# Patient Record
Sex: FEMALE | Race: OTHER | NOT HISPANIC OR LATINO
[De-identification: names, ages, dates, MRNs, and addresses within clinical notes are randomized per-mention and may not be internally consistent; named-entity substitution may affect disease eponyms.]

---

## 2017-04-03 ENCOUNTER — FORM ENCOUNTER (OUTPATIENT)
Age: 42
End: 2017-04-03

## 2017-04-04 ENCOUNTER — APPOINTMENT (OUTPATIENT)
Dept: ULTRASOUND IMAGING | Facility: IMAGING CENTER | Age: 42
End: 2017-04-04

## 2017-04-04 ENCOUNTER — OUTPATIENT (OUTPATIENT)
Dept: OUTPATIENT SERVICES | Facility: HOSPITAL | Age: 42
LOS: 1 days | End: 2017-04-04
Payer: COMMERCIAL

## 2017-04-04 DIAGNOSIS — Z00.8 ENCOUNTER FOR OTHER GENERAL EXAMINATION: ICD-10-CM

## 2017-04-04 PROCEDURE — 76642 ULTRASOUND BREAST LIMITED: CPT

## 2017-10-17 ENCOUNTER — MEDICATION RENEWAL (OUTPATIENT)
Age: 42
End: 2017-10-17

## 2017-12-04 ENCOUNTER — APPOINTMENT (OUTPATIENT)
Dept: INTERNAL MEDICINE | Facility: CLINIC | Age: 42
End: 2017-12-04
Payer: COMMERCIAL

## 2017-12-04 ENCOUNTER — NON-APPOINTMENT (OUTPATIENT)
Age: 42
End: 2017-12-04

## 2017-12-04 VITALS
DIASTOLIC BLOOD PRESSURE: 80 MMHG | SYSTOLIC BLOOD PRESSURE: 110 MMHG | TEMPERATURE: 97.9 F | HEART RATE: 84 BPM | WEIGHT: 134 LBS | OXYGEN SATURATION: 98 % | HEIGHT: 62 IN | BODY MASS INDEX: 24.66 KG/M2

## 2017-12-04 DIAGNOSIS — R00.0 TACHYCARDIA, UNSPECIFIED: ICD-10-CM

## 2017-12-04 DIAGNOSIS — R76.8 OTHER SPECIFIED ABNORMAL IMMUNOLOGICAL FINDINGS IN SERUM: ICD-10-CM

## 2017-12-04 PROCEDURE — 36415 COLL VENOUS BLD VENIPUNCTURE: CPT

## 2017-12-04 PROCEDURE — 99396 PREV VISIT EST AGE 40-64: CPT | Mod: 25

## 2017-12-04 PROCEDURE — 93000 ELECTROCARDIOGRAM COMPLETE: CPT

## 2017-12-05 LAB
25(OH)D3 SERPL-MCNC: 36.1 NG/ML
ALBUMIN SERPL ELPH-MCNC: 5.1 G/DL
ALP BLD-CCNC: 68 U/L
ALT SERPL-CCNC: 20 U/L
ANION GAP SERPL CALC-SCNC: 16 MMOL/L
APPEARANCE: CLEAR
AST SERPL-CCNC: 21 U/L
BASOPHILS # BLD AUTO: 0.04 K/UL
BASOPHILS NFR BLD AUTO: 0.6 %
BILIRUB SERPL-MCNC: 0.4 MG/DL
BILIRUBIN URINE: NEGATIVE
BLOOD URINE: NEGATIVE
BUN SERPL-MCNC: 12 MG/DL
CALCIUM SERPL-MCNC: 10.5 MG/DL
CHLORIDE SERPL-SCNC: 103 MMOL/L
CHOLEST SERPL-MCNC: 189 MG/DL
CHOLEST/HDLC SERPL: 3.4 RATIO
CO2 SERPL-SCNC: 26 MMOL/L
COLOR: YELLOW
CREAT SERPL-MCNC: 0.6 MG/DL
EOSINOPHIL # BLD AUTO: 0.17 K/UL
EOSINOPHIL NFR BLD AUTO: 2.5
ESTRADIOL SERPL-MCNC: <5 PG/ML
FSH SERPL-MCNC: 37.1 IU/L
GLUCOSE QUALITATIVE U: NEGATIVE MG/DL
GLUCOSE SERPL-MCNC: 90 MG/DL
HBA1C MFR BLD HPLC: 5.5 %
HCT VFR BLD CALC: 45.6 %
HDLC SERPL-MCNC: 55 MG/DL
HGB BLD-MCNC: 14.8 G/DL
IMM GRANULOCYTES NFR BLD AUTO: 0.1 %
KETONES URINE: NEGATIVE
LDLC SERPL CALC-MCNC: 111 MG/DL
LEUKOCYTE ESTERASE URINE: NEGATIVE
LH SERPL-ACNC: 22.3 IU/L
LYMPHOCYTES # BLD AUTO: 2.43 K/UL
LYMPHOCYTES NFR BLD AUTO: 36.4 %
MAN DIFF?: NORMAL
MCHC RBC-ENTMCNC: 27.1 PG
MCHC RBC-ENTMCNC: 32.5 GM/DL
MCV RBC AUTO: 83.4 FL
MONOCYTES # BLD AUTO: 0.31 K/UL
MONOCYTES NFR BLD AUTO: 4.6 %
NEUTROPHILS # BLD AUTO: 3.72 K/UL
NEUTROPHILS NFR BLD AUTO: 55.8 %
NITRITE URINE: NEGATIVE
PH URINE: 6
PLATELET # BLD AUTO: 216 K/UL
POTASSIUM SERPL-SCNC: 4.7 MMOL/L
PROT SERPL-MCNC: 8.5 G/DL
PROTEIN URINE: NEGATIVE MG/DL
RBC # BLD: 5.47 M/UL
RBC # FLD: 14.2 %
SODIUM SERPL-SCNC: 145 MMOL/L
SPECIFIC GRAVITY URINE: 1.01
T4 FREE SERPL-MCNC: 1.3 NG/DL
TRIGL SERPL-MCNC: 115 MG/DL
TSH SERPL-ACNC: 3.14 UIU/ML
UROBILINOGEN URINE: NEGATIVE MG/DL
VIT B12 SERPL-MCNC: 737 PG/ML
WBC # FLD AUTO: 6.68 K/UL

## 2017-12-07 LAB
ANA PAT FLD IF-IMP: ABNORMAL
ANA SER IF-ACNC: ABNORMAL
THYROGLOB AB SERPL-ACNC: <20 IU/ML
THYROPEROXIDASE AB SERPL IA-ACNC: <10 IU/ML

## 2018-03-05 ENCOUNTER — APPOINTMENT (OUTPATIENT)
Dept: INTERNAL MEDICINE | Facility: CLINIC | Age: 43
End: 2018-03-05
Payer: COMMERCIAL

## 2018-03-05 VITALS
HEART RATE: 88 BPM | HEIGHT: 62 IN | BODY MASS INDEX: 24.29 KG/M2 | TEMPERATURE: 98.5 F | SYSTOLIC BLOOD PRESSURE: 126 MMHG | DIASTOLIC BLOOD PRESSURE: 78 MMHG | WEIGHT: 132 LBS

## 2018-03-05 DIAGNOSIS — R94.6 ABNORMAL RESULTS OF THYROID FUNCTION STUDIES: ICD-10-CM

## 2018-03-05 PROCEDURE — 99214 OFFICE O/P EST MOD 30 MIN: CPT | Mod: 25

## 2018-03-05 PROCEDURE — 36415 COLL VENOUS BLD VENIPUNCTURE: CPT

## 2018-03-06 ENCOUNTER — MEDICATION RENEWAL (OUTPATIENT)
Age: 43
End: 2018-03-06

## 2018-03-06 LAB
CHOLEST SERPL-MCNC: 235 MG/DL
CHOLEST/HDLC SERPL: 4.6 RATIO
HDLC SERPL-MCNC: 51 MG/DL
LDLC SERPL CALC-MCNC: 153 MG/DL
TRIGL SERPL-MCNC: 155 MG/DL
TSH SERPL-ACNC: 2.93 UIU/ML

## 2018-05-07 ENCOUNTER — APPOINTMENT (OUTPATIENT)
Dept: INTERNAL MEDICINE | Facility: CLINIC | Age: 43
End: 2018-05-07
Payer: COMMERCIAL

## 2018-05-07 VITALS
HEIGHT: 62 IN | TEMPERATURE: 99.1 F | OXYGEN SATURATION: 98 % | SYSTOLIC BLOOD PRESSURE: 100 MMHG | BODY MASS INDEX: 23.92 KG/M2 | DIASTOLIC BLOOD PRESSURE: 60 MMHG | HEART RATE: 105 BPM | WEIGHT: 130 LBS

## 2018-05-07 PROCEDURE — 99213 OFFICE O/P EST LOW 20 MIN: CPT

## 2018-05-08 ENCOUNTER — RESULT REVIEW (OUTPATIENT)
Age: 43
End: 2018-05-08

## 2018-05-08 LAB
HMPV RNA SPEC QL NAA+PROBE: DETECTED
RAPID RVP RESULT: DETECTED

## 2018-05-14 ENCOUNTER — APPOINTMENT (OUTPATIENT)
Dept: INTERNAL MEDICINE | Facility: CLINIC | Age: 43
End: 2018-05-14

## 2018-11-14 ENCOUNTER — MEDICATION RENEWAL (OUTPATIENT)
Age: 43
End: 2018-11-14

## 2019-01-25 ENCOUNTER — APPOINTMENT (OUTPATIENT)
Dept: INTERNAL MEDICINE | Facility: CLINIC | Age: 44
End: 2019-01-25
Payer: COMMERCIAL

## 2019-01-25 VITALS
HEIGHT: 62 IN | WEIGHT: 135 LBS | SYSTOLIC BLOOD PRESSURE: 118 MMHG | HEART RATE: 74 BPM | TEMPERATURE: 97.9 F | OXYGEN SATURATION: 99 % | DIASTOLIC BLOOD PRESSURE: 78 MMHG | BODY MASS INDEX: 24.84 KG/M2

## 2019-01-25 DIAGNOSIS — H52.7 UNSPECIFIED DISORDER OF REFRACTION: ICD-10-CM

## 2019-01-25 PROCEDURE — 99396 PREV VISIT EST AGE 40-64: CPT

## 2019-01-25 RX ORDER — AZITHROMYCIN 250 MG/1
250 TABLET, FILM COATED ORAL
Qty: 1 | Refills: 0 | Status: DISCONTINUED | COMMUNITY
Start: 2018-05-07 | End: 2019-01-25

## 2019-01-25 NOTE — HEALTH RISK ASSESSMENT
[Good] : ~his/her~  mood as  good [No falls in past year] : Patient reported no falls in the past year [0] : 2) Feeling down, depressed, or hopeless: Not at all (0) [Patient reported PAP Smear was normal] : Patient reported PAP Smear was normal [With Significant Other] : lives with significant other [Unemployed] : unemployed [] :  [Feels Safe at Home] : Feels safe at home [Fully functional (bathing, dressing, toileting, transferring, walking, feeding)] : Fully functional (bathing, dressing, toileting, transferring, walking, feeding) [Fully functional (using the telephone, shopping, preparing meals, housekeeping, doing laundry, using] : Fully functional and needs no help or supervision to perform IADLs (using the telephone, shopping, preparing meals, housekeeping, doing laundry, using transportation, managing medications and managing finances) [] : No [de-identified] : none  [[id number]] : 0 [Reports changes in hearing] : Reports no changes in hearing [Reports changes in vision] : Reports no changes in vision [Reports changes in dental health] : Reports no changes in dental health [PapSmearDate] : 12/2016

## 2019-01-25 NOTE — ASSESSMENT
[FreeTextEntry1] : refractory error - to see ophtho\par \par irregular periods \par - LMP 9/2018 ? menopause \par - get HCG \par - see gyn \par  \par Headache tension - resolved now \par - advised hydration \par - adviced to take melatonin and PRN Naproxen as needed \par \par Anxiety / stress / situational - better \par - 50% of the time was spent counseling patient about ways to how to handle her stress, Educated patient to take frequent breaks, mini vacations, breathing exercises relaxation.\par \par Hyperlipidemia\par -on as needed  statins , check lipid levels and liver functions.\par -eat a Low-fat diet, avoid red meat, cheese, butter, peanuts and exercise daily for 40 minutes.\par \par arthritis - repeat RAINA \par \par HCM-\par Mammogram- 6/2016  dense breast , requisition given, educated her risk of breast cancer, has fibroadenoma \par Pap smear- 12/2016 \par Tdap- given 5/2014 \par flu vaccine refused will get at pharmacy \par

## 2019-01-25 NOTE — HISTORY OF PRESENT ILLNESS
[FreeTextEntry1] : \par has 3 kids , 24 daughter(pharmacist)  21 son( premed at [employer])  16 yrs daughter \par  house wife \par \par hx of  headache left side chronic - better now \par - has not had once in few months  -resolved with changes in sleep and eating habits \par -mother in law had dementia -dies 2019 \par \par h/o Abnormal TSH-  gained weight , feels tired and exhausted , very stressed at times \par \par Irregular periods --saw GYN   ,still irregular - LMP- 2018 - q 2-3 months \par \par h/o H pylori infection - 3 yrs ago s/p EGD and dx rx with antibiotics for 14 days , asymptomatic at present \par \par Hyperlipidemia- \par -on Lipitor on and off \par - was started on lipitor by old md for high chol and strong family hx of cad - mom CABG in 57, another MI now at age 68 ,  DAD cad MI at 45 h/o 3 mi   72\par c/o pain in muscles and joints , numbness in both hands \par doing exercise cardio once a week and weights and lisa stretching etc  \par \par came back from [place] 19 - feels better

## 2019-01-28 LAB
25(OH)D3 SERPL-MCNC: 33.8 NG/ML
ALBUMIN SERPL ELPH-MCNC: 4.4 G/DL
ALP BLD-CCNC: 65 U/L
ALT SERPL-CCNC: 23 U/L
ANION GAP SERPL CALC-SCNC: 12 MMOL/L
APPEARANCE: CLEAR
AST SERPL-CCNC: 26 U/L
BASOPHILS # BLD AUTO: 0.02 K/UL
BASOPHILS NFR BLD AUTO: 0.3 %
BILIRUB SERPL-MCNC: 0.3 MG/DL
BILIRUBIN URINE: NEGATIVE
BLOOD URINE: NEGATIVE
BUN SERPL-MCNC: 19 MG/DL
CALCIUM SERPL-MCNC: 9.9 MG/DL
CHLORIDE SERPL-SCNC: 106 MMOL/L
CHOLEST SERPL-MCNC: 182 MG/DL
CHOLEST/HDLC SERPL: 3.7 RATIO
CO2 SERPL-SCNC: 25 MMOL/L
COLOR: YELLOW
CREAT SERPL-MCNC: 0.72 MG/DL
EOSINOPHIL # BLD AUTO: 0.13 K/UL
EOSINOPHIL NFR BLD AUTO: 2.2 %
GLUCOSE QUALITATIVE U: NEGATIVE MG/DL
GLUCOSE SERPL-MCNC: 95 MG/DL
HBA1C MFR BLD HPLC: 5.6 %
HCG SERPL-MCNC: <1 MIU/ML
HCT VFR BLD CALC: 42.2 %
HDLC SERPL-MCNC: 49 MG/DL
HGB BLD-MCNC: 13.7 G/DL
IMM GRANULOCYTES NFR BLD AUTO: 0.2 %
KETONES URINE: NEGATIVE
LDLC SERPL CALC-MCNC: 117 MG/DL
LEUKOCYTE ESTERASE URINE: NEGATIVE
LYMPHOCYTES # BLD AUTO: 2.22 K/UL
LYMPHOCYTES NFR BLD AUTO: 37.8 %
MAN DIFF?: NORMAL
MCHC RBC-ENTMCNC: 26.7 PG
MCHC RBC-ENTMCNC: 32.5 GM/DL
MCV RBC AUTO: 82.1 FL
MONOCYTES # BLD AUTO: 0.29 K/UL
MONOCYTES NFR BLD AUTO: 4.9 %
NEUTROPHILS # BLD AUTO: 3.2 K/UL
NEUTROPHILS NFR BLD AUTO: 54.6 %
NITRITE URINE: NEGATIVE
PH URINE: 5
PLATELET # BLD AUTO: 191 K/UL
POTASSIUM SERPL-SCNC: 4.5 MMOL/L
PROT SERPL-MCNC: 7.3 G/DL
PROTEIN URINE: NEGATIVE MG/DL
RBC # BLD: 5.14 M/UL
RBC # FLD: 14.1 %
SODIUM SERPL-SCNC: 143 MMOL/L
SPECIFIC GRAVITY URINE: 1.02
TRIGL SERPL-MCNC: 81 MG/DL
TSH SERPL-ACNC: 2.19 UIU/ML
UROBILINOGEN URINE: NEGATIVE MG/DL
VIT B12 SERPL-MCNC: 615 PG/ML
WBC # FLD AUTO: 5.87 K/UL

## 2019-09-24 ENCOUNTER — MEDICATION RENEWAL (OUTPATIENT)
Age: 44
End: 2019-09-24

## 2019-12-09 ENCOUNTER — NON-APPOINTMENT (OUTPATIENT)
Age: 44
End: 2019-12-09

## 2019-12-09 ENCOUNTER — APPOINTMENT (OUTPATIENT)
Dept: INTERNAL MEDICINE | Facility: CLINIC | Age: 44
End: 2019-12-09
Payer: COMMERCIAL

## 2019-12-09 VITALS
WEIGHT: 140 LBS | HEART RATE: 96 BPM | DIASTOLIC BLOOD PRESSURE: 70 MMHG | HEIGHT: 62 IN | TEMPERATURE: 98 F | OXYGEN SATURATION: 98 % | SYSTOLIC BLOOD PRESSURE: 125 MMHG | BODY MASS INDEX: 25.76 KG/M2

## 2019-12-09 DIAGNOSIS — R20.2 ANESTHESIA OF SKIN: ICD-10-CM

## 2019-12-09 DIAGNOSIS — R20.0 ANESTHESIA OF SKIN: ICD-10-CM

## 2019-12-09 DIAGNOSIS — G44.209 TENSION-TYPE HEADACHE, UNSPECIFIED, NOT INTRACTABLE: ICD-10-CM

## 2019-12-09 DIAGNOSIS — M25.562 PAIN IN LEFT KNEE: ICD-10-CM

## 2019-12-09 DIAGNOSIS — Z87.09 PERSONAL HISTORY OF OTHER DISEASES OF THE RESPIRATORY SYSTEM: ICD-10-CM

## 2019-12-09 PROCEDURE — 99214 OFFICE O/P EST MOD 30 MIN: CPT | Mod: 25

## 2019-12-09 PROCEDURE — G0008: CPT

## 2019-12-09 PROCEDURE — 93000 ELECTROCARDIOGRAM COMPLETE: CPT

## 2019-12-09 PROCEDURE — 90686 IIV4 VACC NO PRSV 0.5 ML IM: CPT

## 2019-12-09 NOTE — ASSESSMENT
[FreeTextEntry1] : C/o b/l upper ext numbness and tingling - on an doff through day \par - at times wakes up with numbness at night \par - EKG- 89 bpm NSR \par - referral to see cardio given \par \par lower back pain / radiculopathy \par - advised to avoid pulling pushing , lifting , carrying weights , bending , kneeling , squatting \par - do warm compresses \par -Xray l-spine \par -physical therapy if no help -referral to see ortho given \par -naproxen 500 po BID with food \par - side effects reviewed \par - rtc if no improvement\par \par Headache tension - resolved now \par - advised hydration \par - advice to take melatonin and PRN Naproxen as needed \par \par Anxiety / stress / situational - better \par - 50% of the time was spent counseling patient about ways to how to handle her stress, Educated patient to take frequent breaks, mini vacations, breathing exercises relaxation.\par \par Hyperlipidemia\par -on as needed statins , check lipid levels and liver functions.\par -eat a Low-fat diet, avoid red meat, cheese, butter, peanuts and exercise daily for 40 minutes.\par \par \par HCM-\par Mammogram- 6/2016 dense breast , requisition given, educated her risk of breast cancer, has fibroadenoma \par Pap smear- 12/2016 advised \par Tdap- given 5/2014 \par flu vaccine- given today

## 2019-12-09 NOTE — HISTORY OF PRESENT ILLNESS
[de-identified] : has 3 kids , 24 daughter(pharmacist) 21 son( premed at [employer]) 16 yrs daughter \par  house wife \par \par left knee pain and swelling x 1 month \par - symptoms are better now - but gets pain with climbing , cannot bend knee all the way not able to exercise \par -left knee feels stiff \par \par c/o numbness in B/l upper ext on and off \par -no CP + tingling , no MORENO \par \par hx of headache left side chronic - better now \par - has not had once in few months -resolved with changes in sleep and eating habits \par -mother in law had dementia -dies 2019 \par \par h/o Abnormal TSH- gained weight , feels tired and exhausted , very stressed at times \par \par Irregular periods --saw GYN 2016 ,still irregular - LMP-2019 \par \par Hyperlipidemia- \par -on Lipitor on and off \par - was started on lipitor by old md for high chol and strong family hx of cad - mom CABG in 57, another MI now at age 68 , DAD cad MI at 45 h/o 3 mi  70\par c/o pain in muscles and joints , numbness in both hands \par

## 2020-03-09 ENCOUNTER — APPOINTMENT (OUTPATIENT)
Dept: INTERNAL MEDICINE | Facility: CLINIC | Age: 45
End: 2020-03-09
Payer: COMMERCIAL

## 2020-03-09 ENCOUNTER — LABORATORY RESULT (OUTPATIENT)
Age: 45
End: 2020-03-09

## 2020-03-09 VITALS
OXYGEN SATURATION: 98 % | DIASTOLIC BLOOD PRESSURE: 66 MMHG | WEIGHT: 136 LBS | BODY MASS INDEX: 25.03 KG/M2 | SYSTOLIC BLOOD PRESSURE: 120 MMHG | HEIGHT: 62 IN | HEART RATE: 83 BPM | TEMPERATURE: 98.5 F

## 2020-03-09 PROCEDURE — 36415 COLL VENOUS BLD VENIPUNCTURE: CPT

## 2020-03-09 PROCEDURE — 99396 PREV VISIT EST AGE 40-64: CPT | Mod: 25

## 2020-03-09 PROCEDURE — G0442 ANNUAL ALCOHOL SCREEN 15 MIN: CPT

## 2020-03-09 RX ORDER — NAPROXEN 500 MG/1
500 TABLET ORAL
Qty: 14 | Refills: 0 | Status: DISCONTINUED | COMMUNITY
Start: 2019-12-09 | End: 2020-03-09

## 2020-03-09 NOTE — HISTORY OF PRESENT ILLNESS
[de-identified] : came in for annual check up \par \par has 3 kids , 25 daughter(pharmacist) 22 son( premed at [employer]) 17 yrs daughter \par  house wife \par \par left knee pain and swelling x 1 month \par - symptoms are better now - but gets pain with climbing , cannot bend knee all the way not able to exercise \par -left knee feels stiff \par \par c/o numbness in B/l upper ext on and off \par -no CP + tingling , no MORENO \par \par hx of headache left side chronic - better now \par - has not had once in few months -resolved with changes in sleep and eating habits \par -mother in law had dementia -dies 2019 \par \par h/o Abnormal TSH- gained weight , feels tired and exhausted , very stressed at times \par \par Irregular periods --saw GYN  ,still irregular - 3-6 months -occ spotting \par \par Hyperlipidemia- \par -on Lipitor on and off \par - was started on lipitor by old md for high chol and strong family hx of cad - mom CABG in 57, another MI now at age 68 , DAD cad MI at 45 h/o 3 mi  72\par c/o pain in muscles and joints , numbness in both hands \par

## 2020-03-09 NOTE — ASSESSMENT
[FreeTextEntry1] : C/o b/l upper ext numbness and tingling - on an doff through day \par -better now \par \par lower back pain / radiculopathy \par - advised to avoid pulling pushing , lifting , carrying weights , bending , kneeling , squatting \par - do warm compresses \par \par Headache tension - resolved now \par - advised hydration \par - advice to take melatonin and PRN Naproxen as needed \par \par Anxiety / stress / situational - better \par - 50% of the time was spent counseling patient about ways to how to handle her stress, Educated patient to take frequent breaks, mini vacations, breathing exercises relaxation.\par \par Hyperlipidemia\par -on as needed statins , check lipid levels and liver functions.\par -eat a Low-fat diet, avoid red meat, cheese, butter, peanuts and exercise daily for 40 minutes.\par \par HCM-\par Mammogram- 6/2016 dense breast , requisition given, educated her risk of breast cancer, has fibroadenoma \par Pap smear- 12/2016 advised \par Tdap- given 5/2014 \par flu vaccine-12/2019 \par

## 2020-03-09 NOTE — HEALTH RISK ASSESSMENT
[Good] : ~his/her~  mood as  good [No] : No [No falls in past year] : Patient reported no falls in the past year [0] : 2) Feeling down, depressed, or hopeless: Not at all (0) [Patient reported PAP Smear was normal] : Patient reported PAP Smear was normal [With Significant Other] : lives with significant other [Unemployed] : unemployed [] :  [Feels Safe at Home] : Feels safe at home [Fully functional (bathing, dressing, toileting, transferring, walking, feeding)] : Fully functional (bathing, dressing, toileting, transferring, walking, feeding) [Fully functional (using the telephone, shopping, preparing meals, housekeeping, doing laundry, using] : Fully functional and needs no help or supervision to perform IADLs (using the telephone, shopping, preparing meals, housekeeping, doing laundry, using transportation, managing medications and managing finances) [] : No [de-identified] : gym  [[id number]] : 0 [Reports changes in hearing] : Reports no changes in hearing [Reports changes in vision] : Reports no changes in vision [Reports changes in dental health] : Reports no changes in dental health [PapSmearDate] : 2016

## 2020-03-10 LAB
25(OH)D3 SERPL-MCNC: 31.1 NG/ML
ALBUMIN SERPL ELPH-MCNC: 4.6 G/DL
ALP BLD-CCNC: 72 U/L
ALT SERPL-CCNC: 24 U/L
ANION GAP SERPL CALC-SCNC: 16 MMOL/L
APPEARANCE: CLEAR
AST SERPL-CCNC: 20 U/L
BASOPHILS # BLD AUTO: 0.08 K/UL
BASOPHILS NFR BLD AUTO: 1.3 %
BILIRUB SERPL-MCNC: 0.3 MG/DL
BILIRUBIN URINE: NEGATIVE
BLOOD URINE: ABNORMAL
BUN SERPL-MCNC: 13 MG/DL
CALCIUM SERPL-MCNC: 9.6 MG/DL
CCP AB SER IA-ACNC: <8 UNITS
CHLORIDE SERPL-SCNC: 104 MMOL/L
CHOLEST SERPL-MCNC: 204 MG/DL
CHOLEST/HDLC SERPL: 4.6 RATIO
CO2 SERPL-SCNC: 20 MMOL/L
COLOR: YELLOW
CREAT SERPL-MCNC: 0.64 MG/DL
EOSINOPHIL # BLD AUTO: 0.13 K/UL
EOSINOPHIL NFR BLD AUTO: 2.1 %
ESTIMATED AVERAGE GLUCOSE: 108 MG/DL
GLUCOSE QUALITATIVE U: NEGATIVE
GLUCOSE SERPL-MCNC: 91 MG/DL
HBA1C MFR BLD HPLC: 5.4 %
HCT VFR BLD CALC: 42 %
HDLC SERPL-MCNC: 44 MG/DL
HGB BLD-MCNC: 13.7 G/DL
IMM GRANULOCYTES NFR BLD AUTO: 0.2 %
KETONES URINE: NEGATIVE
LDLC SERPL CALC-MCNC: 140 MG/DL
LEUKOCYTE ESTERASE URINE: NEGATIVE
LYMPHOCYTES # BLD AUTO: 2.16 K/UL
LYMPHOCYTES NFR BLD AUTO: 34.7 %
MAN DIFF?: NORMAL
MCHC RBC-ENTMCNC: 27.3 PG
MCHC RBC-ENTMCNC: 32.6 GM/DL
MCV RBC AUTO: 83.7 FL
MONOCYTES # BLD AUTO: 0.31 K/UL
MONOCYTES NFR BLD AUTO: 5 %
NEUTROPHILS # BLD AUTO: 3.53 K/UL
NEUTROPHILS NFR BLD AUTO: 56.7 %
NITRITE URINE: NEGATIVE
PH URINE: 5.5
PLATELET # BLD AUTO: 238 K/UL
POTASSIUM SERPL-SCNC: 4.5 MMOL/L
PROT SERPL-MCNC: 7.1 G/DL
PROTEIN URINE: NEGATIVE
RBC # BLD: 5.02 M/UL
RBC # FLD: 14 %
RF+CCP IGG SER-IMP: NEGATIVE
RHEUMATOID FACT SER QL: <10 IU/ML
SODIUM SERPL-SCNC: 140 MMOL/L
SPECIFIC GRAVITY URINE: 1.02
TRIGL SERPL-MCNC: 99 MG/DL
TSH SERPL-ACNC: 1.7 UIU/ML
UROBILINOGEN URINE: NORMAL
VIT B12 SERPL-MCNC: 744 PG/ML
WBC # FLD AUTO: 6.22 K/UL

## 2020-03-11 LAB — ANA SER IF-ACNC: NEGATIVE

## 2020-07-23 ENCOUNTER — OUTPATIENT (OUTPATIENT)
Dept: OUTPATIENT SERVICES | Facility: HOSPITAL | Age: 45
LOS: 1 days | End: 2020-07-23
Payer: COMMERCIAL

## 2020-07-23 ENCOUNTER — APPOINTMENT (OUTPATIENT)
Dept: RADIOLOGY | Facility: IMAGING CENTER | Age: 45
End: 2020-07-23
Payer: COMMERCIAL

## 2020-07-23 ENCOUNTER — APPOINTMENT (OUTPATIENT)
Dept: ULTRASOUND IMAGING | Facility: IMAGING CENTER | Age: 45
End: 2020-07-23
Payer: COMMERCIAL

## 2020-07-23 ENCOUNTER — RESULT REVIEW (OUTPATIENT)
Age: 45
End: 2020-07-23

## 2020-07-23 ENCOUNTER — APPOINTMENT (OUTPATIENT)
Dept: MAMMOGRAPHY | Facility: IMAGING CENTER | Age: 45
End: 2020-07-23
Payer: COMMERCIAL

## 2020-07-23 DIAGNOSIS — N63.0 UNSPECIFIED LUMP IN UNSPECIFIED BREAST: ICD-10-CM

## 2020-07-23 DIAGNOSIS — Z00.00 ENCOUNTER FOR GENERAL ADULT MEDICAL EXAMINATION WITHOUT ABNORMAL FINDINGS: ICD-10-CM

## 2020-07-23 DIAGNOSIS — Z00.8 ENCOUNTER FOR OTHER GENERAL EXAMINATION: ICD-10-CM

## 2020-07-23 DIAGNOSIS — M25.562 PAIN IN LEFT KNEE: ICD-10-CM

## 2020-07-23 PROCEDURE — 77063 BREAST TOMOSYNTHESIS BI: CPT

## 2020-07-23 PROCEDURE — 76641 ULTRASOUND BREAST COMPLETE: CPT | Mod: 26,50

## 2020-07-23 PROCEDURE — 73562 X-RAY EXAM OF KNEE 3: CPT | Mod: 26,LT

## 2020-07-23 PROCEDURE — 77063 BREAST TOMOSYNTHESIS BI: CPT | Mod: 26

## 2020-07-23 PROCEDURE — 77067 SCR MAMMO BI INCL CAD: CPT | Mod: 26

## 2020-07-23 PROCEDURE — 77067 SCR MAMMO BI INCL CAD: CPT

## 2020-07-23 PROCEDURE — 76641 ULTRASOUND BREAST COMPLETE: CPT

## 2020-07-23 PROCEDURE — 73562 X-RAY EXAM OF KNEE 3: CPT

## 2021-01-14 ENCOUNTER — RX RENEWAL (OUTPATIENT)
Age: 46
End: 2021-01-14

## 2021-03-11 ENCOUNTER — APPOINTMENT (OUTPATIENT)
Dept: INTERNAL MEDICINE | Facility: CLINIC | Age: 46
End: 2021-03-11
Payer: COMMERCIAL

## 2021-03-11 VITALS
SYSTOLIC BLOOD PRESSURE: 118 MMHG | OXYGEN SATURATION: 98 % | HEART RATE: 83 BPM | TEMPERATURE: 97.9 F | BODY MASS INDEX: 25.97 KG/M2 | DIASTOLIC BLOOD PRESSURE: 86 MMHG | WEIGHT: 142 LBS

## 2021-03-11 DIAGNOSIS — M54.5 LOW BACK PAIN: ICD-10-CM

## 2021-03-11 PROCEDURE — 36415 COLL VENOUS BLD VENIPUNCTURE: CPT

## 2021-03-11 PROCEDURE — G0442 ANNUAL ALCOHOL SCREEN 15 MIN: CPT

## 2021-03-11 PROCEDURE — 99396 PREV VISIT EST AGE 40-64: CPT | Mod: 25

## 2021-03-11 PROCEDURE — G0444 DEPRESSION SCREEN ANNUAL: CPT

## 2021-03-11 PROCEDURE — 99072 ADDL SUPL MATRL&STAF TM PHE: CPT

## 2021-03-11 PROCEDURE — 99213 OFFICE O/P EST LOW 20 MIN: CPT | Mod: 25

## 2021-03-11 NOTE — HEALTH RISK ASSESSMENT
[Good] : ~his/her~  mood as  good [No] : No [0] : 2) Feeling down, depressed, or hopeless: Not at all (0) [With Significant Other] : lives with significant other [Unemployed] : unemployed [] :  [] : No [Audit-CScore] : 0 [de-identified] : sedenatry  [[id number]] : 0 [Reports changes in hearing] : Reports no changes in hearing [Reports changes in vision] : Reports no changes in vision [Reports changes in dental health] : Reports no changes in dental health

## 2021-03-11 NOTE — HISTORY OF PRESENT ILLNESS
[de-identified] : came in for annual check up \par \par lost mom 2020 heart attack at age 70 , later uncle also  2021 CAD \par daughter got  10/2020 \par \par has 3 kids , 25 daughter(pharmacist) 22 son( premed at [employer]) 17 yrs daughter \par  house wife \par \par heel pain sp after standing for long on and off admits to wearing flats \par \par Lower back pain ch - no radiculopathy rad to buttock x 4 yrs on and off \par -no CP + tingling , no MORENO \par \par hx of headache left side chronic - better now \par - has not had once in few months -resolved with changes in sleep and eating habits \par -mother in law had dementia -dies 2019 \par \par h/o Abnormal TSH- gained weight , feels tired and exhausted , very stressed at times \par \par Irregular periods --saw GYN  ,still irregular - 3-6 months -occ spotting \par \par Hyperlipidemia- \par -on Lipitor on and off \par - was started on lipitor by old md for high chol and strong family hx of cad - mom CABG in 57, another MI now at age 68, , at 70 yrs from MI  , DAD cad MI at 45 h/o 3 mi  70, maternal uncle  MI 70's \par c/o pain in muscles and joints , numbness in both hands \par \par occ boils in breast fold / axilla - uses mupirocin cream needs rx \par

## 2021-03-11 NOTE — ASSESSMENT
[FreeTextEntry1] : lower back pain / radiculopathy x 4 yrs \par - get xray L spine\par -pt referral given \par - advised to avoid pulling pushing , lifting , carrying weights , bending , kneeling , squatting \par - do warm compresses \par \par Boils rx for Mupirocin cream renewed \par \par Anxiety / stress / situational - better \par - 50% of the time was spent counseling patient about ways to how to handle her stress, Educated patient to take frequent breaks, mini vacations, breathing exercises relaxation.\par \par Hyperlipidemia\par -on as needed statins , check lipid levels and liver functions.\par -eat a Low-fat diet, avoid red meat, cheese, butter, peanuts and exercise daily for 40 minutes.\par \par Postmenopausal - LMP 6/2020 \par -get TSH, FSH, LH , estradiol \par \par HCM-\par Mammogram- 6/2020 bi rad 2 dense breast , requisition given, educated her risk of breast cancer, has fibroadenoma \par Pap smear- 12/2016 advised referral given \par Tdap- given 5/2014 \par flu vaccine-12/2019 \par . \par

## 2021-03-12 LAB
25(OH)D3 SERPL-MCNC: 48.3 NG/ML
ALBUMIN SERPL ELPH-MCNC: 4.7 G/DL
ALP BLD-CCNC: 86 U/L
ALT SERPL-CCNC: 37 U/L
ANION GAP SERPL CALC-SCNC: 14 MMOL/L
APPEARANCE: CLEAR
AST SERPL-CCNC: 29 U/L
BASOPHILS # BLD AUTO: 0.07 K/UL
BASOPHILS NFR BLD AUTO: 1 %
BILIRUB SERPL-MCNC: 0.5 MG/DL
BILIRUBIN URINE: NEGATIVE
BLOOD URINE: NEGATIVE
BUN SERPL-MCNC: 14 MG/DL
CALCIUM SERPL-MCNC: 10.3 MG/DL
CHLORIDE SERPL-SCNC: 102 MMOL/L
CHOLEST SERPL-MCNC: 192 MG/DL
CO2 SERPL-SCNC: 25 MMOL/L
COLOR: NORMAL
CREAT SERPL-MCNC: 0.67 MG/DL
EOSINOPHIL # BLD AUTO: 0.12 K/UL
EOSINOPHIL NFR BLD AUTO: 1.8 %
ESTIMATED AVERAGE GLUCOSE: 120 MG/DL
ESTRADIOL SERPL-MCNC: <5 PG/ML
FSH SERPL-MCNC: 40.2 IU/L
GLUCOSE QUALITATIVE U: NEGATIVE
GLUCOSE SERPL-MCNC: 91 MG/DL
HBA1C MFR BLD HPLC: 5.8 %
HCT VFR BLD CALC: 43.4 %
HDLC SERPL-MCNC: 49 MG/DL
HGB BLD-MCNC: 14.5 G/DL
IMM GRANULOCYTES NFR BLD AUTO: 0.1 %
KETONES URINE: NEGATIVE
LDLC SERPL CALC-MCNC: 115 MG/DL
LEUKOCYTE ESTERASE URINE: NEGATIVE
LH SERPL-ACNC: 27.8 IU/L
LYMPHOCYTES # BLD AUTO: 2.52 K/UL
LYMPHOCYTES NFR BLD AUTO: 37.2 %
MAN DIFF?: NORMAL
MCHC RBC-ENTMCNC: 26.4 PG
MCHC RBC-ENTMCNC: 33.4 GM/DL
MCV RBC AUTO: 79.1 FL
MONOCYTES # BLD AUTO: 0.37 K/UL
MONOCYTES NFR BLD AUTO: 5.5 %
NEUTROPHILS # BLD AUTO: 3.68 K/UL
NEUTROPHILS NFR BLD AUTO: 54.4 %
NITRITE URINE: NEGATIVE
NONHDLC SERPL-MCNC: 143 MG/DL
PH URINE: 6
PLATELET # BLD AUTO: 240 K/UL
POTASSIUM SERPL-SCNC: 4.6 MMOL/L
PROT SERPL-MCNC: 7.6 G/DL
PROTEIN URINE: NEGATIVE
RBC # BLD: 5.49 M/UL
RBC # FLD: 13.7 %
SODIUM SERPL-SCNC: 140 MMOL/L
SPECIFIC GRAVITY URINE: 1.01
TRIGL SERPL-MCNC: 142 MG/DL
TSH SERPL-ACNC: 2.42 UIU/ML
UROBILINOGEN URINE: NORMAL
VIT B12 SERPL-MCNC: 974 PG/ML
WBC # FLD AUTO: 6.77 K/UL

## 2021-09-15 ENCOUNTER — RX RENEWAL (OUTPATIENT)
Age: 46
End: 2021-09-15

## 2021-10-15 ENCOUNTER — APPOINTMENT (OUTPATIENT)
Dept: INTERNAL MEDICINE | Facility: CLINIC | Age: 46
End: 2021-10-15
Payer: COMMERCIAL

## 2021-10-15 VITALS
WEIGHT: 134 LBS | TEMPERATURE: 98.3 F | OXYGEN SATURATION: 98 % | DIASTOLIC BLOOD PRESSURE: 80 MMHG | HEIGHT: 62 IN | SYSTOLIC BLOOD PRESSURE: 126 MMHG | BODY MASS INDEX: 24.66 KG/M2 | HEART RATE: 71 BPM

## 2021-10-15 DIAGNOSIS — N95.1 MENOPAUSAL AND FEMALE CLIMACTERIC STATES: ICD-10-CM

## 2021-10-15 PROCEDURE — 99214 OFFICE O/P EST MOD 30 MIN: CPT

## 2021-10-15 NOTE — ASSESSMENT
[FreeTextEntry1] : \par Right little finger - trigger finger \par - referral to se hand sp given \par \par lower back pain / radiculopathy x 4 yrs -better\par - advised to avoid pulling pushing , lifting , carrying weights , bending , kneeling , squatting \par - do warm compresses \par \par Anxiety / stress / situational - better \par - 50% of the time was spent counseling patient about ways to how to handle her stress, Educated patient to take frequent breaks, mini vacations, breathing exercises relaxation.\par \par Hyperlipidemia\par -on as needed statins , check lipid levels and liver functions.\par -eat a Low-fat diet, avoid red meat, cheese, butter, peanuts and exercise daily for 40 minutes.\par \par Postmenopausal - LMP 6/2020 \par FSH/ LH elevated \par \par HCM-\par Mammogram- 6/2020 bi rad 2 dense breast ,pt deferred to next year 2022  requisition given,\par - educated her risk of breast cancer, has fibroadenoma \par Pap smear- 12/2016 advised referral given \par Tdap- given 5/2014 \par flu vaccine-12/2019 \par colonoscope- advised - pt deferred to 2022 get FIT \par

## 2021-10-15 NOTE — REVIEW OF SYSTEMS
[facility] phone call message - order or referral request from patient:     Order or referral being requested: Requested order Speech Therapy    Additional Details: Mother of patient stated before COVID they were referred to a speech therapist but couldn't be seen due to COVID but now that places are opening back up, they would like assistance with scheduling. please advise.     Referral only -Specialty N/A Location N/A    OK to leave a message on voice mail? Yes    Primary language: Honduran      needed? Yes    Call taken on September 1, 2020 at 1:02 PM by Hillary Holman    Order request route to [facility] TRIAGE   Referrals Route to [facility] ([facility]) CARE COORDINATOR        
Will forward to PCP to address. The current order is . They are seeing new patients.    Louise Thompson CMA  [facility] Care Coordinator    
[Negative] : Gastrointestinal

## 2021-10-25 ENCOUNTER — APPOINTMENT (OUTPATIENT)
Dept: ORTHOPEDIC SURGERY | Facility: CLINIC | Age: 46
End: 2021-10-25
Payer: COMMERCIAL

## 2021-10-25 VITALS
BODY MASS INDEX: 25.76 KG/M2 | HEIGHT: 62 IN | WEIGHT: 140 LBS | SYSTOLIC BLOOD PRESSURE: 126 MMHG | DIASTOLIC BLOOD PRESSURE: 80 MMHG

## 2021-10-25 DIAGNOSIS — M65.30 TRIGGER FINGER, UNSPECIFIED FINGER: ICD-10-CM

## 2021-10-25 PROCEDURE — 99203 OFFICE O/P NEW LOW 30 MIN: CPT | Mod: 25

## 2021-10-25 PROCEDURE — 20550 NJX 1 TENDON SHEATH/LIGAMENT: CPT | Mod: F4

## 2021-10-25 NOTE — ADDENDUM
[FreeTextEntry1] : I, Bianca Marx wrote this note acting as a scribe for Dr. Galen Munoz on Oct 25, 2021.

## 2021-10-25 NOTE — PHYSICAL EXAM
[de-identified] : Patient is WDWN, alert, and in no acute distress. Breathing is unlabored. She is grossly oriented to person, place, and time.\par \par Right hand: \par There is A1 pulley tenderness in the right little finger with locking and triggering. Full arc of motion in the fingers, and all intrinsic and extrinsic hand muscles 5/5. No joint instability on provocative testing, sensation is intact to light touch, and no skin lesions or discoloration.  [de-identified] : no imaging today

## 2021-10-25 NOTE — END OF VISIT
[FreeTextEntry3] : All medical record entries made by the Scribe were at my,  Dr. Galen Munoz MD., direction and personally dictated by me on 10/25/2021. I have personally reviewed the chart and agree that the record accurately reflects my personal performance of the history, physical exam, assessment and plan.

## 2021-10-25 NOTE — HISTORY OF PRESENT ILLNESS
[Right] : right hand dominant [FreeTextEntry1] : Pt is a 46 y/o female c/o right little finger pain and triggering x 3-4 months.  She has tenderness over the A1 pulley.  The finger locks.  It is painful to  anything. \par \par She is a borderline diabetic.

## 2021-10-25 NOTE — DISCUSSION/SUMMARY
[FreeTextEntry1] : The underlying pathophysiology was reviewed with the patient. Discussed at length the nature of the patient’s condition. The right little finger symptoms appear secondary to trigger finger.\par \par At this time, I am recommending a cortisone injection into the flexor tendon sheath of the right little finger.\par The patient wishes to proceed with a cortisone injection at this time (1). The skin was prepped with alcohol and sprayed with Ethyl Chloride. An injection of 0.5 cc 1% Lidocaine without epinephrine, 0.25 cc Kenalog 40mg, and 0.25 cc Dexamethasone was administered into the flexor tendon sheath of the right little finger. The patient tolerated the procedure well. Apply ice. \par \par Patient can continue activities as tolerated. All questions answered, understanding verbalized. Patient in agreement with plan of care. Follow up as needed, according to her symptoms.

## 2021-12-09 ENCOUNTER — TRANSCRIPTION ENCOUNTER (OUTPATIENT)
Age: 46
End: 2021-12-09

## 2021-12-10 ENCOUNTER — APPOINTMENT (OUTPATIENT)
Dept: INTERNAL MEDICINE | Facility: CLINIC | Age: 46
End: 2021-12-10
Payer: COMMERCIAL

## 2021-12-10 VITALS
OXYGEN SATURATION: 98 % | TEMPERATURE: 97.8 F | HEIGHT: 62 IN | HEART RATE: 104 BPM | SYSTOLIC BLOOD PRESSURE: 128 MMHG | WEIGHT: 138 LBS | BODY MASS INDEX: 25.4 KG/M2 | DIASTOLIC BLOOD PRESSURE: 76 MMHG

## 2021-12-10 DIAGNOSIS — R73.03 PREDIABETES.: ICD-10-CM

## 2021-12-10 DIAGNOSIS — M65.341 TRIGGER FINGER, RIGHT RING FINGER: ICD-10-CM

## 2021-12-10 DIAGNOSIS — M27.9 DISEASE OF JAWS, UNSPECIFIED: ICD-10-CM

## 2021-12-10 DIAGNOSIS — E78.5 HYPERLIPIDEMIA, UNSPECIFIED: ICD-10-CM

## 2021-12-10 DIAGNOSIS — M26.609 UNSPECIFIED TEMPOROMANDIBULAR JOINT DISORDER: ICD-10-CM

## 2021-12-10 PROCEDURE — 90686 IIV4 VACC NO PRSV 0.5 ML IM: CPT

## 2021-12-10 PROCEDURE — 36415 COLL VENOUS BLD VENIPUNCTURE: CPT

## 2021-12-10 PROCEDURE — 99214 OFFICE O/P EST MOD 30 MIN: CPT | Mod: 25

## 2021-12-10 PROCEDURE — G0008: CPT

## 2021-12-10 RX ORDER — NAPROXEN 500 MG/1
500 TABLET ORAL
Qty: 14 | Refills: 0 | Status: ACTIVE | COMMUNITY
Start: 2021-12-10 | End: 1900-01-01

## 2021-12-10 NOTE — ASSESSMENT
[FreeTextEntry1] : TMJ -locked jaw - Impacted left upper tooth - get ESR \par -Dental evaluation advised \par -naproxen 500 Po BID x 1 week with food \par \par Right little finger - trigger finger -ortho consult 10/2021 reviewed s/p cortisone shot - better now \par \par Impaired sugars AIC 5.8 3/21 - get aic \par \par lower back pain / radiculopathy x 4 yrs -better\par - advised to avoid pulling pushing , lifting , carrying weights , bending , kneeling , squatting \par - do warm compresses \par \par Anxiety / stress / situational - better \par - 50% of the time was spent counseling patient about ways to how to handle her stress, Educated patient to take frequent breaks, mini vacations, breathing exercises relaxation.\par \par Hyperlipidemia\par -on as needed statins , check lipid levels and liver functions.\par -eat a Low-fat diet, avoid red meat, cheese, butter, peanuts and exercise daily for 40 minutes.\par \par Postmenopausal - LMP 6/2020 \par FSH/ LH elevated \par \par HCM-\par Mammogram- 6/2020 bi rad 2 dense breast ,pt deferred to next year 2022 requisition given,\par - educated her risk of breast cancer, has fibroadenoma \par Pap smear- 12/2016 advised referral given \par Tdap- given 5/2014 \par flu vaccine-12/10/21 \par colonoscope- advised - pt deferred to 2022 get FIT ordered again \par

## 2021-12-10 NOTE — PHYSICAL EXAM
[No Lymphadenopathy] : no lymphadenopathy [Normal] : soft, non-tender, non-distended, no masses palpated, no HSM and normal bowel sounds [de-identified] : Sore left upper jaw -ingrown impacted tooth

## 2021-12-10 NOTE — HISTORY OF PRESENT ILLNESS
[FreeTextEntry8] : seen as acute \par \par c/o left TMJ pain - x 1 week > - cannot open mouth properly - no pain in tooth , cannot eat properly - feels sore in mouth , left ear pain , feels pain around left head and forehead \par \par c/o unable to extend her right hand pinky - gets bend and stuck painful x 3 month unable to cook do any work - saw ortho hand specialist S/p cortisone shot 10/25/21 feels better \par \par h/o Abnormal TSH- gained weight , feels tired and exhausted , very stressed at times \par \par Irregular periods --saw GYN  ,still irregular - 3-6 months -occ spotting \par \par Hyperlipidemia- \par -on Lipitor on and off \par - was started on lipitor by old md for high chol and strong family hx of cad - mom CABG in 57, another MI now at age 68, , at 70 yrs from MI , DAD cad MI at 45 h/o 3 mi  72, maternal uncle  MI 70's \par c/o pain in muscles and joints , numbness in both hands \par \par \par

## 2021-12-13 LAB
ANA SER IF-ACNC: NEGATIVE
ERYTHROCYTE [SEDIMENTATION RATE] IN BLOOD BY WESTERGREN METHOD: 14 MM/HR

## 2021-12-19 ENCOUNTER — RX RENEWAL (OUTPATIENT)
Age: 46
End: 2021-12-19

## 2022-03-30 ENCOUNTER — APPOINTMENT (OUTPATIENT)
Dept: INTERNAL MEDICINE | Facility: CLINIC | Age: 47
End: 2022-03-30

## 2022-10-19 ENCOUNTER — APPOINTMENT (OUTPATIENT)
Dept: INTERNAL MEDICINE | Facility: CLINIC | Age: 47
End: 2022-10-19

## 2022-10-19 VITALS
HEIGHT: 62 IN | DIASTOLIC BLOOD PRESSURE: 85 MMHG | WEIGHT: 136 LBS | HEART RATE: 74 BPM | BODY MASS INDEX: 25.03 KG/M2 | SYSTOLIC BLOOD PRESSURE: 125 MMHG | OXYGEN SATURATION: 99 % | TEMPERATURE: 97.6 F

## 2022-10-19 DIAGNOSIS — R53.83 OTHER MALAISE: ICD-10-CM

## 2022-10-19 DIAGNOSIS — Z23 ENCOUNTER FOR IMMUNIZATION: ICD-10-CM

## 2022-10-19 DIAGNOSIS — R53.81 OTHER MALAISE: ICD-10-CM

## 2022-10-19 PROCEDURE — G0008: CPT

## 2022-10-19 PROCEDURE — 90686 IIV4 VACC NO PRSV 0.5 ML IM: CPT

## 2022-10-19 PROCEDURE — 99396 PREV VISIT EST AGE 40-64: CPT | Mod: 25

## 2022-10-19 PROCEDURE — G0444 DEPRESSION SCREEN ANNUAL: CPT | Mod: 59

## 2022-10-19 RX ORDER — VITAMIN B COMPLEX
TABLET ORAL
Qty: 30 | Refills: 6 | Status: ACTIVE | COMMUNITY
Start: 2022-10-19 | End: 1900-01-01

## 2022-10-19 RX ORDER — MUPIROCIN 20 MG/G
2 OINTMENT TOPICAL
Qty: 1 | Refills: 0 | Status: ACTIVE | COMMUNITY
Start: 2021-03-11 | End: 1900-01-01
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
## 2022-10-19 NOTE — HEALTH RISK ASSESSMENT
[Good] : ~his/her~  mood as  good [Never] : Never [No] : In the past 12 months have you used drugs other than those required for medical reasons? No [No falls in past year] : Patient reported no falls in the past year [0] : 2) Feeling down, depressed, or hopeless: Not at all (0) [PHQ-2 Negative - No further assessment needed] : PHQ-2 Negative - No further assessment needed [With Significant Other] : lives with significant other [Unemployed] : unemployed [] :  [Fully functional (bathing, dressing, toileting, transferring, walking, feeding)] : Fully functional (bathing, dressing, toileting, transferring, walking, feeding) [Fully functional (using the telephone, shopping, preparing meals, housekeeping, doing laundry, using] : Fully functional and needs no help or supervision to perform IADLs (using the telephone, shopping, preparing meals, housekeeping, doing laundry, using transportation, managing medications and managing finances) [de-identified] : gym 4 x a week - tredmill, weights etc - 45 minutes  [[id number]] : 0 [Reports changes in hearing] : Reports no changes in hearing [Reports changes in vision] : Reports no changes in vision [Reports changes in dental health] : Reports no changes in dental health
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
## 2023-02-03 ENCOUNTER — RX RENEWAL (OUTPATIENT)
Age: 48
End: 2023-02-03

## 2023-03-14 ENCOUNTER — APPOINTMENT (OUTPATIENT)
Dept: ULTRASOUND IMAGING | Facility: CLINIC | Age: 48
End: 2023-03-14
Payer: COMMERCIAL

## 2023-03-14 ENCOUNTER — RESULT REVIEW (OUTPATIENT)
Age: 48
End: 2023-03-14

## 2023-03-14 ENCOUNTER — APPOINTMENT (OUTPATIENT)
Dept: MAMMOGRAPHY | Facility: CLINIC | Age: 48
End: 2023-03-14
Payer: COMMERCIAL

## 2023-03-14 ENCOUNTER — OUTPATIENT (OUTPATIENT)
Dept: OUTPATIENT SERVICES | Facility: HOSPITAL | Age: 48
LOS: 1 days | End: 2023-03-14
Payer: COMMERCIAL

## 2023-03-14 DIAGNOSIS — R92.1 MAMMOGRAPHIC CALCIFICATION FOUND ON DIAGNOSTIC IMAGING OF BREAST: ICD-10-CM

## 2023-03-14 DIAGNOSIS — N63.0 UNSPECIFIED LUMP IN UNSPECIFIED BREAST: ICD-10-CM

## 2023-03-14 PROCEDURE — 77063 BREAST TOMOSYNTHESIS BI: CPT

## 2023-03-14 PROCEDURE — 77063 BREAST TOMOSYNTHESIS BI: CPT | Mod: 26

## 2023-03-14 PROCEDURE — 77067 SCR MAMMO BI INCL CAD: CPT | Mod: 26

## 2023-03-14 PROCEDURE — 76641 ULTRASOUND BREAST COMPLETE: CPT

## 2023-03-14 PROCEDURE — 76641 ULTRASOUND BREAST COMPLETE: CPT | Mod: 26,50

## 2023-03-14 PROCEDURE — 77067 SCR MAMMO BI INCL CAD: CPT

## 2023-03-17 ENCOUNTER — APPOINTMENT (OUTPATIENT)
Dept: MAMMOGRAPHY | Facility: CLINIC | Age: 48
End: 2023-03-17
Payer: COMMERCIAL

## 2023-03-17 ENCOUNTER — RESULT REVIEW (OUTPATIENT)
Age: 48
End: 2023-03-17

## 2023-03-17 ENCOUNTER — OUTPATIENT (OUTPATIENT)
Dept: OUTPATIENT SERVICES | Facility: HOSPITAL | Age: 48
LOS: 1 days | End: 2023-03-17
Payer: COMMERCIAL

## 2023-03-17 DIAGNOSIS — Z00.00 ENCOUNTER FOR GENERAL ADULT MEDICAL EXAMINATION WITHOUT ABNORMAL FINDINGS: ICD-10-CM

## 2023-03-17 PROCEDURE — 77065 DX MAMMO INCL CAD UNI: CPT

## 2023-03-17 PROCEDURE — 77065 DX MAMMO INCL CAD UNI: CPT | Mod: 26,LT

## 2023-03-21 ENCOUNTER — RESULT REVIEW (OUTPATIENT)
Age: 48
End: 2023-03-21

## 2023-03-21 ENCOUNTER — OUTPATIENT (OUTPATIENT)
Dept: OUTPATIENT SERVICES | Facility: HOSPITAL | Age: 48
LOS: 1 days | End: 2023-03-21
Payer: COMMERCIAL

## 2023-03-21 ENCOUNTER — RX RENEWAL (OUTPATIENT)
Age: 48
End: 2023-03-21

## 2023-03-21 ENCOUNTER — APPOINTMENT (OUTPATIENT)
Dept: MAMMOGRAPHY | Facility: CLINIC | Age: 48
End: 2023-03-21
Payer: COMMERCIAL

## 2023-03-21 DIAGNOSIS — Z00.8 ENCOUNTER FOR OTHER GENERAL EXAMINATION: ICD-10-CM

## 2023-03-21 PROCEDURE — 19081 BX BREAST 1ST LESION STRTCTC: CPT

## 2023-03-21 PROCEDURE — 19081 BX BREAST 1ST LESION STRTCTC: CPT | Mod: LT

## 2023-03-21 PROCEDURE — A4648: CPT

## 2023-03-21 PROCEDURE — 88305 TISSUE EXAM BY PATHOLOGIST: CPT | Mod: 26

## 2023-03-21 PROCEDURE — 77065 DX MAMMO INCL CAD UNI: CPT

## 2023-03-21 PROCEDURE — 88305 TISSUE EXAM BY PATHOLOGIST: CPT

## 2023-03-21 PROCEDURE — 77065 DX MAMMO INCL CAD UNI: CPT | Mod: 26,LT

## 2023-04-27 ENCOUNTER — RX RENEWAL (OUTPATIENT)
Age: 48
End: 2023-04-27

## 2023-05-15 ENCOUNTER — RX RENEWAL (OUTPATIENT)
Age: 48
End: 2023-05-15

## 2023-12-01 ENCOUNTER — RX RENEWAL (OUTPATIENT)
Age: 48
End: 2023-12-01

## 2024-02-10 ENCOUNTER — NON-APPOINTMENT (OUTPATIENT)
Age: 49
End: 2024-02-10

## 2024-02-11 ENCOUNTER — RX RENEWAL (OUTPATIENT)
Age: 49
End: 2024-02-11

## 2024-02-28 ENCOUNTER — APPOINTMENT (OUTPATIENT)
Dept: INTERNAL MEDICINE | Facility: CLINIC | Age: 49
End: 2024-02-28
Payer: COMMERCIAL

## 2024-02-28 VITALS
HEART RATE: 84 BPM | TEMPERATURE: 97.7 F | SYSTOLIC BLOOD PRESSURE: 116 MMHG | BODY MASS INDEX: 26.13 KG/M2 | DIASTOLIC BLOOD PRESSURE: 77 MMHG | WEIGHT: 142 LBS | HEIGHT: 62 IN | OXYGEN SATURATION: 98 %

## 2024-02-28 DIAGNOSIS — N20.0 CALCULUS OF KIDNEY: ICD-10-CM

## 2024-02-28 DIAGNOSIS — L02.92 FURUNCLE, UNSPECIFIED: ICD-10-CM

## 2024-02-28 DIAGNOSIS — Z00.00 ENCOUNTER FOR GENERAL ADULT MEDICAL EXAMINATION W/OUT ABNORMAL FINDINGS: ICD-10-CM

## 2024-02-28 PROCEDURE — 99396 PREV VISIT EST AGE 40-64: CPT

## 2024-02-28 RX ORDER — L. ACIDOPHILUS/BIFID. ANIMALIS 15.5B CELL
CAPSULE ORAL
Qty: 60 | Refills: 4 | Status: COMPLETED | COMMUNITY
Start: 2023-03-21 | End: 2024-02-28

## 2024-02-28 RX ORDER — CETIRIZINE HYDROCHLORIDE 10 MG/1
10 TABLET, FILM COATED ORAL DAILY
Qty: 15 | Refills: 0 | Status: ACTIVE | COMMUNITY
Start: 2024-02-28 | End: 1900-01-01

## 2024-02-28 NOTE — PHYSICAL EXAM
[No Acute Distress] : no acute distress [Well-Appearing] : well-appearing [Normal Sclera/Conjunctiva] : normal sclera/conjunctiva [PERRL] : pupils equal round and reactive to light [EOMI] : extraocular movements intact [Normal Outer Ear/Nose] : the outer ears and nose were normal in appearance [Normal Oropharynx] : the oropharynx was normal [No JVD] : no jugular venous distention [No Lymphadenopathy] : no lymphadenopathy [Supple] : supple [No Respiratory Distress] : no respiratory distress  [Thyroid Normal, No Nodules] : the thyroid was normal and there were no nodules present [No Accessory Muscle Use] : no accessory muscle use [Normal Rate] : normal rate  [Clear to Auscultation] : lungs were clear to auscultation bilaterally [Regular Rhythm] : with a regular rhythm [No Murmur] : no murmur heard [Normal S1, S2] : normal S1 and S2 [No Varicosities] : no varicosities [Pedal Pulses Present] : the pedal pulses are present [No Edema] : there was no peripheral edema [No Extremity Clubbing/Cyanosis] : no extremity clubbing/cyanosis [Soft] : abdomen soft [Non Tender] : non-tender [Non-distended] : non-distended [No Masses] : no abdominal mass palpated [No HSM] : no HSM [Normal Bowel Sounds] : normal bowel sounds [Normal Posterior Cervical Nodes] : no posterior cervical lymphadenopathy [Normal Anterior Cervical Nodes] : no anterior cervical lymphadenopathy [No CVA Tenderness] : no CVA  tenderness [No Spinal Tenderness] : no spinal tenderness [No Joint Swelling] : no joint swelling [Grossly Normal Strength/Tone] : grossly normal strength/tone [No Rash] : no rash [Coordination Grossly Intact] : coordination grossly intact [No Focal Deficits] : no focal deficits [Normal Gait] : normal gait [Deep Tendon Reflexes (DTR)] : deep tendon reflexes were 2+ and symmetric [Normal Affect] : the affect was normal [Normal Insight/Judgement] : insight and judgment were intact
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
## 2024-02-28 NOTE — HEALTH RISK ASSESSMENT
[Good] : ~his/her~  mood as  good [No] : No [No falls in past year] : Patient reported no falls in the past year [0] : 2) Feeling down, depressed, or hopeless: Not at all (0) [PHQ-2 Negative - No further assessment needed] : PHQ-2 Negative - No further assessment needed [Patient declined PAP Smear] : Patient declined PAP Smear [Patient declined colonoscopy] : Patient declined colonoscopy [With Significant Other] : lives with significant other [Unemployed] : unemployed [] :  [Never] : Never [de-identified] : gym 3 -4x a week  [[id number]] : 0 [Reports changes in hearing] : Reports no changes in hearing [Reports changes in dental health] : Reports no changes in dental health [Reports changes in vision] : Reports no changes in vision

## 2024-02-28 NOTE — HISTORY OF PRESENT ILLNESS
[de-identified] : seen for CPE   lost mom 2020 heart attack at age 70 , later uncle also  2021 CAD  daughter got  10/2020  has 3 kids , 27 daughter(pharmacist) 24son( now DO at [employer] ) 19 yrs daughter   house wife   Was in ER [facility] for back pain was getting worse dx as renal stone - gave IV fluids pain medication - stone passed   hx trigger finger -unable to extend her right hand pinky - gets bend and stuck painful x 3 month unable to cook do any work - saw hand sp  - did cortisone shot - now better - resolved   Lower back pain ch - no radiculopathy rad to buttock x 4 yrs on and off  -no CP + tingling , no MORENO   hx of headache left side chronic - better now  - has not had once in few months -resolved with changes in sleep and eating habits  -mother in law had dementia -dies 2019   h/o Abnormal TSH- gained weight , feels tired and exhausted , very stressed at times   Hyperlipidemia-  -on Lipitor on and off  - was started on lipitor by old md for high chol and strong family hx of cad - mom CABG in 57, another MI now at age 68, , at 70 yrs from MI , DAD cad MI at 45 h/o 3 mi  72, maternal uncle  MI 70's  c/o pain in muscles and joints , numbness in both hands

## 2024-05-24 ENCOUNTER — RX RENEWAL (OUTPATIENT)
Age: 49
End: 2024-05-24

## 2024-12-03 ENCOUNTER — APPOINTMENT (OUTPATIENT)
Dept: INTERNAL MEDICINE | Facility: CLINIC | Age: 49
End: 2024-12-03
Payer: COMMERCIAL

## 2024-12-03 PROCEDURE — 90472 IMMUNIZATION ADMIN EACH ADD: CPT

## 2024-12-03 PROCEDURE — 90656 IIV3 VACC NO PRSV 0.5 ML IM: CPT

## 2024-12-03 PROCEDURE — 90715 TDAP VACCINE 7 YRS/> IM: CPT

## 2024-12-03 PROCEDURE — G0008: CPT

## 2025-01-17 ENCOUNTER — RX RENEWAL (OUTPATIENT)
Age: 50
End: 2025-01-17

## 2025-01-17 RX ORDER — L. ACIDOPHILUS/BIFID. ANIMALIS 31B CELL
CAPSULE ORAL
Qty: 60 | Refills: 2 | Status: ACTIVE | COMMUNITY
Start: 2025-01-17 | End: 1900-01-01

## 2025-02-11 ENCOUNTER — RX RENEWAL (OUTPATIENT)
Age: 50
End: 2025-02-11

## 2025-05-15 ENCOUNTER — RX RENEWAL (OUTPATIENT)
Age: 50
End: 2025-05-15

## 2025-07-30 ENCOUNTER — RX RENEWAL (OUTPATIENT)
Age: 50
End: 2025-07-30